# Patient Record
Sex: MALE | Race: WHITE
[De-identification: names, ages, dates, MRNs, and addresses within clinical notes are randomized per-mention and may not be internally consistent; named-entity substitution may affect disease eponyms.]

---

## 2019-09-26 ENCOUNTER — HOSPITAL ENCOUNTER (INPATIENT)
Dept: HOSPITAL 26 - MED | Age: 34
LOS: 4 days | Discharge: HOME | DRG: 720 | End: 2019-09-30
Attending: GENERAL PRACTICE | Admitting: GENERAL PRACTICE
Payer: MEDICAID

## 2019-09-26 VITALS — BODY MASS INDEX: 42.44 KG/M2 | WEIGHT: 280 LBS | HEIGHT: 68 IN

## 2019-09-26 VITALS — DIASTOLIC BLOOD PRESSURE: 90 MMHG | SYSTOLIC BLOOD PRESSURE: 147 MMHG

## 2019-09-26 VITALS — DIASTOLIC BLOOD PRESSURE: 75 MMHG | SYSTOLIC BLOOD PRESSURE: 135 MMHG

## 2019-09-26 DIAGNOSIS — M72.6: ICD-10-CM

## 2019-09-26 DIAGNOSIS — R65.20: ICD-10-CM

## 2019-09-26 DIAGNOSIS — E44.0: ICD-10-CM

## 2019-09-26 DIAGNOSIS — Z87.891: ICD-10-CM

## 2019-09-26 DIAGNOSIS — R73.03: ICD-10-CM

## 2019-09-26 DIAGNOSIS — Z91.013: ICD-10-CM

## 2019-09-26 DIAGNOSIS — E66.01: ICD-10-CM

## 2019-09-26 DIAGNOSIS — E87.8: ICD-10-CM

## 2019-09-26 DIAGNOSIS — E87.1: ICD-10-CM

## 2019-09-26 DIAGNOSIS — L03.312: ICD-10-CM

## 2019-09-26 DIAGNOSIS — L02.212: ICD-10-CM

## 2019-09-26 DIAGNOSIS — A41.9: Primary | ICD-10-CM

## 2019-09-26 LAB
ALBUMIN FLD-MCNC: 3 G/DL (ref 3.4–5)
AMYLASE SERPL-CCNC: 46 U/L (ref 25–115)
ANION GAP SERPL CALCULATED.3IONS-SCNC: 14.2 MMOL/L (ref 8–16)
AST SERPL-CCNC: 16 U/L (ref 15–37)
BILIRUB SERPL-MCNC: 0.6 MG/DL (ref 0–1)
BUN SERPL-MCNC: 13 MG/DL (ref 7–18)
CHLORIDE SERPL-SCNC: 97 MMOL/L (ref 98–107)
CO2 SERPL-SCNC: 25.9 MMOL/L (ref 21–32)
CREAT SERPL-MCNC: 1 MG/DL (ref 0.7–1.3)
ERYTHROCYTE [DISTWIDTH] IN BLOOD BY AUTOMATED COUNT: 13.1 % (ref 11.6–13.7)
GFR SERPL CREATININE-BSD FRML MDRD: 110 ML/MIN (ref 90–?)
GLUCOSE SERPL-MCNC: 269 MG/DL (ref 74–106)
HCT VFR BLD AUTO: 44.1 % (ref 36–52)
HGB BLD-MCNC: 14.7 G/DL (ref 12–18)
LIPASE SERPL-CCNC: 355 U/L (ref 73–393)
LYMPHOCYTES NFR BLD MANUAL: 11 % (ref 20–46)
MAGNESIUM SERPL-MCNC: 1.8 MG/DL (ref 1.8–2.4)
MCH RBC QN AUTO: 32 PG (ref 27–31)
MCHC RBC AUTO-ENTMCNC: 33 G/DL (ref 33–37)
MCV RBC AUTO: 94.9 FL (ref 80–94)
MONOCYTES NFR BLD MANUAL: 8 % (ref 5–12)
PHOSPHATE SERPL-MCNC: 3.5 MG/DL (ref 2.5–4.9)
PLATELET # BLD AUTO: 347 K/UL (ref 140–450)
POTASSIUM SERPL-SCNC: 4.1 MMOL/L (ref 3.5–5.1)
PROTHROMBIN TIME: 10.4 SECS (ref 10.8–13.4)
RBC # BLD AUTO: 4.65 MIL/UL (ref 4.2–6.1)
SODIUM SERPL-SCNC: 133 MMOL/L (ref 136–145)
T4 FREE SERPL-MCNC: 1.44 NG/DL (ref 0.76–1.46)
TSH SERPL DL<=0.05 MIU/L-ACNC: 2.78 UIU/ML (ref 0.34–3.74)
WBC # BLD AUTO: 23.2 K/UL (ref 4.8–10.8)

## 2019-09-26 PROCEDURE — 0JB70ZZ EXCISION OF BACK SUBCUTANEOUS TISSUE AND FASCIA, OPEN APPROACH: ICD-10-PCS | Performed by: SURGERY

## 2019-09-26 RX ADMIN — DOCUSATE SODIUM SCH MG: 100 CAPSULE, LIQUID FILLED ORAL at 21:38

## 2019-09-26 RX ADMIN — INSULIN LISPRO PRN UNITS: 100 INJECTION, SOLUTION INTRAVENOUS; SUBCUTANEOUS at 21:58

## 2019-09-26 RX ADMIN — SODIUM CHLORIDE SCH MLS/HR: 9 INJECTION, SOLUTION INTRAVENOUS at 21:38

## 2019-09-26 RX ADMIN — Medication SCH DEV: at 21:48

## 2019-09-27 VITALS — DIASTOLIC BLOOD PRESSURE: 70 MMHG | SYSTOLIC BLOOD PRESSURE: 113 MMHG

## 2019-09-27 VITALS — DIASTOLIC BLOOD PRESSURE: 74 MMHG | SYSTOLIC BLOOD PRESSURE: 110 MMHG

## 2019-09-27 VITALS — DIASTOLIC BLOOD PRESSURE: 73 MMHG | SYSTOLIC BLOOD PRESSURE: 126 MMHG

## 2019-09-27 LAB
ANION GAP SERPL CALCULATED.3IONS-SCNC: 16.3 MMOL/L (ref 8–16)
BASOPHILS # BLD AUTO: 0.1 K/UL (ref 0–0.22)
BASOPHILS NFR BLD AUTO: 0.3 % (ref 0–2)
BUN SERPL-MCNC: 9 MG/DL (ref 7–18)
CHLORIDE SERPL-SCNC: 100 MMOL/L (ref 98–107)
CHOLEST/HDLC SERPL: 8.3 {RATIO} (ref 1–4.5)
CO2 SERPL-SCNC: 23.8 MMOL/L (ref 21–32)
CREAT SERPL-MCNC: 0.9 MG/DL (ref 0.7–1.3)
EOSINOPHIL # BLD AUTO: 0.2 K/UL (ref 0–0.4)
EOSINOPHIL NFR BLD AUTO: 1.2 % (ref 0–4)
ERYTHROCYTE [DISTWIDTH] IN BLOOD BY AUTOMATED COUNT: 13.1 % (ref 11.6–13.7)
GFR SERPL CREATININE-BSD FRML MDRD: 124 ML/MIN (ref 90–?)
GLUCOSE SERPL-MCNC: 188 MG/DL (ref 74–106)
HCT VFR BLD AUTO: 43.2 % (ref 36–52)
HDLC SERPL-MCNC: 12 MG/DL (ref 40–60)
HGB BLD-MCNC: 14.4 G/DL (ref 12–18)
LDLC SERPL CALC-MCNC: 65 MG/DL (ref 60–100)
LYMPHOCYTES # BLD AUTO: 1.7 K/UL (ref 2–11.5)
LYMPHOCYTES NFR BLD AUTO: 11 % (ref 20.5–51.1)
MAGNESIUM SERPL-MCNC: 1.8 MG/DL (ref 1.8–2.4)
MCH RBC QN AUTO: 32 PG (ref 27–31)
MCHC RBC AUTO-ENTMCNC: 33 G/DL (ref 33–37)
MCV RBC AUTO: 95.2 FL (ref 80–94)
MONOCYTES # BLD AUTO: 1.1 K/UL (ref 0.8–1)
MONOCYTES NFR BLD AUTO: 7 % (ref 1.7–9.3)
NEUTROPHILS # BLD AUTO: 12.8 K/UL (ref 1.8–7.7)
NEUTROPHILS NFR BLD AUTO: 80.5 % (ref 42.2–75.2)
PHOSPHATE SERPL-MCNC: 3.9 MG/DL (ref 2.5–4.9)
PLATELET # BLD AUTO: 378 K/UL (ref 140–450)
POTASSIUM SERPL-SCNC: 4.1 MMOL/L (ref 3.5–5.1)
RBC # BLD AUTO: 4.54 MIL/UL (ref 4.2–6.1)
SODIUM SERPL-SCNC: 136 MMOL/L (ref 136–145)
TRIGL SERPL-MCNC: 114 MG/DL (ref 30–150)
WBC # BLD AUTO: 15.9 K/UL (ref 4.8–10.8)

## 2019-09-27 RX ADMIN — DEXTROSE SCH MLS/HR: 50 INJECTION, SOLUTION INTRAVENOUS at 05:53

## 2019-09-27 RX ADMIN — DEXTROSE SCH MLS/HR: 50 INJECTION, SOLUTION INTRAVENOUS at 00:33

## 2019-09-27 RX ADMIN — DEXTROSE SCH MLS/HR: 50 INJECTION, SOLUTION INTRAVENOUS at 23:25

## 2019-09-27 RX ADMIN — INSULIN LISPRO PRN UNITS: 100 INJECTION, SOLUTION INTRAVENOUS; SUBCUTANEOUS at 06:35

## 2019-09-27 RX ADMIN — Medication SCH DEV: at 06:31

## 2019-09-27 RX ADMIN — SODIUM CHLORIDE PRN MG: 9 INJECTION, SOLUTION INTRAVENOUS at 10:24

## 2019-09-27 RX ADMIN — DEXTROSE SCH MLS/HR: 50 INJECTION, SOLUTION INTRAVENOUS at 19:26

## 2019-09-27 RX ADMIN — SODIUM CHLORIDE SCH MLS/HR: 9 INJECTION, SOLUTION INTRAVENOUS at 21:29

## 2019-09-27 RX ADMIN — Medication SCH DEV: at 11:30

## 2019-09-27 RX ADMIN — DEXTROSE SCH MLS/HR: 5 SOLUTION INTRAVENOUS at 08:41

## 2019-09-27 RX ADMIN — SODIUM CHLORIDE SCH MLS/HR: 9 INJECTION, SOLUTION INTRAVENOUS at 08:42

## 2019-09-27 RX ADMIN — SODIUM CHLORIDE SCH MLS/HR: 9 INJECTION, SOLUTION INTRAVENOUS at 05:54

## 2019-09-27 RX ADMIN — Medication SCH DEV: at 16:44

## 2019-09-27 RX ADMIN — DEXTROSE SCH MLS/HR: 50 INJECTION, SOLUTION INTRAVENOUS at 13:06

## 2019-09-27 RX ADMIN — DEXTROSE SCH MLS/HR: 5 SOLUTION INTRAVENOUS at 16:36

## 2019-09-27 RX ADMIN — SODIUM CHLORIDE SCH MLS/HR: 9 INJECTION, SOLUTION INTRAVENOUS at 15:35

## 2019-09-27 RX ADMIN — INSULIN LISPRO PRN UNITS: 100 INJECTION, SOLUTION INTRAVENOUS; SUBCUTANEOUS at 21:27

## 2019-09-27 RX ADMIN — DOCUSATE SODIUM SCH MG: 100 CAPSULE, LIQUID FILLED ORAL at 08:41

## 2019-09-27 RX ADMIN — DOCUSATE SODIUM SCH MG: 100 CAPSULE, LIQUID FILLED ORAL at 21:14

## 2019-09-27 RX ADMIN — Medication SCH DEV: at 21:14

## 2019-09-27 RX ADMIN — HYDROCODONE BITARTRATE AND ACETAMINOPHEN PRN TAB: 7.5; 325 TABLET ORAL at 16:51

## 2019-09-27 RX ADMIN — Medication SCH EACH: at 08:41

## 2019-09-27 RX ADMIN — INSULIN LISPRO PRN UNITS: 100 INJECTION, SOLUTION INTRAVENOUS; SUBCUTANEOUS at 13:12

## 2019-09-28 VITALS — DIASTOLIC BLOOD PRESSURE: 93 MMHG | SYSTOLIC BLOOD PRESSURE: 135 MMHG

## 2019-09-28 VITALS — SYSTOLIC BLOOD PRESSURE: 140 MMHG | DIASTOLIC BLOOD PRESSURE: 86 MMHG

## 2019-09-28 VITALS — DIASTOLIC BLOOD PRESSURE: 83 MMHG | SYSTOLIC BLOOD PRESSURE: 134 MMHG

## 2019-09-28 LAB
ANION GAP SERPL CALCULATED.3IONS-SCNC: 13 MMOL/L (ref 8–16)
BASOPHILS # BLD AUTO: 0.1 K/UL (ref 0–0.22)
BASOPHILS NFR BLD AUTO: 1.2 % (ref 0–2)
BUN SERPL-MCNC: 11 MG/DL (ref 7–18)
CHLORIDE SERPL-SCNC: 100 MMOL/L (ref 98–107)
CO2 SERPL-SCNC: 27.4 MMOL/L (ref 21–32)
CREAT SERPL-MCNC: 0.8 MG/DL (ref 0.7–1.3)
EOSINOPHIL # BLD AUTO: 0.3 K/UL (ref 0–0.4)
EOSINOPHIL NFR BLD AUTO: 2.7 % (ref 0–4)
ERYTHROCYTE [DISTWIDTH] IN BLOOD BY AUTOMATED COUNT: 13.5 % (ref 11.6–13.7)
GFR SERPL CREATININE-BSD FRML MDRD: 142 ML/MIN (ref 90–?)
GLUCOSE SERPL-MCNC: 162 MG/DL (ref 74–106)
HCT VFR BLD AUTO: 42.6 % (ref 36–52)
HGB BLD-MCNC: 13.9 G/DL (ref 12–18)
LYMPHOCYTES # BLD AUTO: 2.3 K/UL (ref 2–11.5)
LYMPHOCYTES NFR BLD AUTO: 18.4 % (ref 20.5–51.1)
MAGNESIUM SERPL-MCNC: 1.9 MG/DL (ref 1.8–2.4)
MCH RBC QN AUTO: 31 PG (ref 27–31)
MCHC RBC AUTO-ENTMCNC: 33 G/DL (ref 33–37)
MCV RBC AUTO: 95.8 FL (ref 80–94)
MONOCYTES # BLD AUTO: 0.9 K/UL (ref 0.8–1)
MONOCYTES NFR BLD AUTO: 7.6 % (ref 1.7–9.3)
NEUTROPHILS # BLD AUTO: 8.7 K/UL (ref 1.8–7.7)
NEUTROPHILS NFR BLD AUTO: 70.1 % (ref 42.2–75.2)
PHOSPHATE SERPL-MCNC: 4.1 MG/DL (ref 2.5–4.9)
PLATELET # BLD AUTO: 350 K/UL (ref 140–450)
POTASSIUM SERPL-SCNC: 4.4 MMOL/L (ref 3.5–5.1)
RBC # BLD AUTO: 4.44 MIL/UL (ref 4.2–6.1)
SODIUM SERPL-SCNC: 136 MMOL/L (ref 136–145)
WBC # BLD AUTO: 12.4 K/UL (ref 4.8–10.8)

## 2019-09-28 RX ADMIN — INSULIN LISPRO PRN UNITS: 100 INJECTION, SOLUTION INTRAVENOUS; SUBCUTANEOUS at 06:29

## 2019-09-28 RX ADMIN — Medication SCH DEV: at 17:28

## 2019-09-28 RX ADMIN — SODIUM CHLORIDE SCH MLS/HR: 9 INJECTION, SOLUTION INTRAVENOUS at 20:48

## 2019-09-28 RX ADMIN — DEXTROSE SCH MLS/HR: 5 SOLUTION INTRAVENOUS at 18:30

## 2019-09-28 RX ADMIN — Medication SCH DEV: at 06:29

## 2019-09-28 RX ADMIN — Medication SCH EACH: at 08:44

## 2019-09-28 RX ADMIN — DEXTROSE SCH MLS/HR: 5 SOLUTION INTRAVENOUS at 01:20

## 2019-09-28 RX ADMIN — DEXTROSE SCH MLS/HR: 50 INJECTION, SOLUTION INTRAVENOUS at 06:32

## 2019-09-28 RX ADMIN — HYDROCODONE BITARTRATE AND ACETAMINOPHEN PRN TAB: 7.5; 325 TABLET ORAL at 08:44

## 2019-09-28 RX ADMIN — INSULIN LISPRO PRN UNITS: 100 INJECTION, SOLUTION INTRAVENOUS; SUBCUTANEOUS at 13:09

## 2019-09-28 RX ADMIN — DOCUSATE SODIUM SCH MG: 100 CAPSULE, LIQUID FILLED ORAL at 08:44

## 2019-09-28 RX ADMIN — Medication SCH DEV: at 13:07

## 2019-09-28 RX ADMIN — DEXTROSE SCH MLS/HR: 50 INJECTION, SOLUTION INTRAVENOUS at 13:10

## 2019-09-28 RX ADMIN — DOCUSATE SODIUM SCH MG: 100 CAPSULE, LIQUID FILLED ORAL at 08:55

## 2019-09-28 RX ADMIN — HYDROCODONE BITARTRATE AND ACETAMINOPHEN PRN TAB: 7.5; 325 TABLET ORAL at 18:26

## 2019-09-28 RX ADMIN — Medication SCH DEV: at 20:48

## 2019-09-28 RX ADMIN — DEXTROSE SCH MLS/HR: 5 SOLUTION INTRAVENOUS at 08:49

## 2019-09-28 RX ADMIN — DEXTROSE SCH MLS/HR: 50 INJECTION, SOLUTION INTRAVENOUS at 17:33

## 2019-09-28 RX ADMIN — DOCUSATE SODIUM SCH MG: 100 CAPSULE, LIQUID FILLED ORAL at 20:43

## 2019-09-28 RX ADMIN — INSULIN LISPRO PRN UNITS: 100 INJECTION, SOLUTION INTRAVENOUS; SUBCUTANEOUS at 20:47

## 2019-09-28 RX ADMIN — DEXTROSE SCH MLS/HR: 50 INJECTION, SOLUTION INTRAVENOUS at 23:18

## 2019-09-29 VITALS — DIASTOLIC BLOOD PRESSURE: 87 MMHG | SYSTOLIC BLOOD PRESSURE: 128 MMHG

## 2019-09-29 VITALS — SYSTOLIC BLOOD PRESSURE: 138 MMHG | DIASTOLIC BLOOD PRESSURE: 85 MMHG

## 2019-09-29 VITALS — DIASTOLIC BLOOD PRESSURE: 90 MMHG | SYSTOLIC BLOOD PRESSURE: 131 MMHG

## 2019-09-29 LAB
ANION GAP SERPL CALCULATED.3IONS-SCNC: 11.8 MMOL/L (ref 8–16)
BASOPHILS # BLD AUTO: 0 K/UL (ref 0–0.22)
BASOPHILS NFR BLD AUTO: 0.3 % (ref 0–2)
BUN SERPL-MCNC: 10 MG/DL (ref 7–18)
CHLORIDE SERPL-SCNC: 101 MMOL/L (ref 98–107)
CO2 SERPL-SCNC: 30.4 MMOL/L (ref 21–32)
CREAT SERPL-MCNC: 0.8 MG/DL (ref 0.7–1.3)
EOSINOPHIL # BLD AUTO: 0.3 K/UL (ref 0–0.4)
EOSINOPHIL NFR BLD AUTO: 2.9 % (ref 0–4)
ERYTHROCYTE [DISTWIDTH] IN BLOOD BY AUTOMATED COUNT: 13.1 % (ref 11.6–13.7)
GFR SERPL CREATININE-BSD FRML MDRD: 142 ML/MIN (ref 90–?)
GLUCOSE SERPL-MCNC: 148 MG/DL (ref 74–106)
HCT VFR BLD AUTO: 44 % (ref 36–52)
HGB BLD-MCNC: 14.5 G/DL (ref 12–18)
LYMPHOCYTES # BLD AUTO: 2.1 K/UL (ref 2–11.5)
LYMPHOCYTES NFR BLD AUTO: 21 % (ref 20.5–51.1)
MAGNESIUM SERPL-MCNC: 1.8 MG/DL (ref 1.8–2.4)
MCH RBC QN AUTO: 32 PG (ref 27–31)
MCHC RBC AUTO-ENTMCNC: 33 G/DL (ref 33–37)
MCV RBC AUTO: 95.9 FL (ref 80–94)
MONOCYTES # BLD AUTO: 0.7 K/UL (ref 0.8–1)
MONOCYTES NFR BLD AUTO: 7.4 % (ref 1.7–9.3)
NEUTROPHILS # BLD AUTO: 6.9 K/UL (ref 1.8–7.7)
NEUTROPHILS NFR BLD AUTO: 68.4 % (ref 42.2–75.2)
PHOSPHATE SERPL-MCNC: 4.3 MG/DL (ref 2.5–4.9)
PLATELET # BLD AUTO: 397 K/UL (ref 140–450)
POTASSIUM SERPL-SCNC: 4.2 MMOL/L (ref 3.5–5.1)
RBC # BLD AUTO: 4.59 MIL/UL (ref 4.2–6.1)
SODIUM SERPL-SCNC: 139 MMOL/L (ref 136–145)
WBC # BLD AUTO: 10 K/UL (ref 4.8–10.8)

## 2019-09-29 RX ADMIN — DEXTROSE SCH MLS/HR: 50 INJECTION, SOLUTION INTRAVENOUS at 17:25

## 2019-09-29 RX ADMIN — DEXTROSE SCH MLS/HR: 50 INJECTION, SOLUTION INTRAVENOUS at 12:54

## 2019-09-29 RX ADMIN — INSULIN LISPRO PRN UNITS: 100 INJECTION, SOLUTION INTRAVENOUS; SUBCUTANEOUS at 21:26

## 2019-09-29 RX ADMIN — DEXTROSE SCH MLS/HR: 5 SOLUTION INTRAVENOUS at 18:05

## 2019-09-29 RX ADMIN — Medication SCH DEV: at 21:06

## 2019-09-29 RX ADMIN — Medication SCH DEV: at 16:30

## 2019-09-29 RX ADMIN — DEXTROSE SCH MLS/HR: 50 INJECTION, SOLUTION INTRAVENOUS at 05:48

## 2019-09-29 RX ADMIN — DEXTROSE SCH MLS/HR: 5 SOLUTION INTRAVENOUS at 09:16

## 2019-09-29 RX ADMIN — Medication SCH DEV: at 06:37

## 2019-09-29 RX ADMIN — INSULIN LISPRO PRN UNITS: 100 INJECTION, SOLUTION INTRAVENOUS; SUBCUTANEOUS at 12:55

## 2019-09-29 RX ADMIN — Medication SCH DEV: at 11:30

## 2019-09-29 RX ADMIN — DOCUSATE SODIUM SCH MG: 100 CAPSULE, LIQUID FILLED ORAL at 21:00

## 2019-09-29 RX ADMIN — SODIUM CHLORIDE PRN MG: 9 INJECTION, SOLUTION INTRAVENOUS at 21:11

## 2019-09-29 RX ADMIN — SODIUM CHLORIDE SCH MLS/HR: 9 INJECTION, SOLUTION INTRAVENOUS at 13:40

## 2019-09-29 RX ADMIN — DOCUSATE SODIUM SCH MG: 100 CAPSULE, LIQUID FILLED ORAL at 21:07

## 2019-09-29 RX ADMIN — SODIUM CHLORIDE PRN MG: 9 INJECTION, SOLUTION INTRAVENOUS at 10:40

## 2019-09-29 RX ADMIN — DEXTROSE SCH MLS/HR: 5 SOLUTION INTRAVENOUS at 00:16

## 2019-09-29 RX ADMIN — Medication SCH EACH: at 09:11

## 2019-09-29 RX ADMIN — DOCUSATE SODIUM SCH MG: 100 CAPSULE, LIQUID FILLED ORAL at 09:00

## 2019-09-29 RX ADMIN — HYDROCODONE BITARTRATE AND ACETAMINOPHEN PRN TAB: 7.5; 325 TABLET ORAL at 09:10

## 2019-09-30 VITALS — SYSTOLIC BLOOD PRESSURE: 122 MMHG | DIASTOLIC BLOOD PRESSURE: 82 MMHG

## 2019-09-30 VITALS — DIASTOLIC BLOOD PRESSURE: 79 MMHG | SYSTOLIC BLOOD PRESSURE: 131 MMHG

## 2019-09-30 LAB
ANION GAP SERPL CALCULATED.3IONS-SCNC: 13.2 MMOL/L (ref 8–16)
BASOPHILS # BLD AUTO: 0.1 K/UL (ref 0–0.22)
BASOPHILS NFR BLD AUTO: 0.7 % (ref 0–2)
BUN SERPL-MCNC: 14 MG/DL (ref 7–18)
CHLORIDE SERPL-SCNC: 99 MMOL/L (ref 98–107)
CO2 SERPL-SCNC: 28.9 MMOL/L (ref 21–32)
CREAT SERPL-MCNC: 0.8 MG/DL (ref 0.7–1.3)
EOSINOPHIL # BLD AUTO: 0.2 K/UL (ref 0–0.4)
EOSINOPHIL NFR BLD AUTO: 2.1 % (ref 0–4)
ERYTHROCYTE [DISTWIDTH] IN BLOOD BY AUTOMATED COUNT: 13.2 % (ref 11.6–13.7)
GFR SERPL CREATININE-BSD FRML MDRD: 142 ML/MIN (ref 90–?)
GLUCOSE SERPL-MCNC: 137 MG/DL (ref 74–106)
HCT VFR BLD AUTO: 45.4 % (ref 36–52)
HGB BLD-MCNC: 15.1 G/DL (ref 12–18)
LYMPHOCYTES # BLD AUTO: 2.2 K/UL (ref 2–11.5)
LYMPHOCYTES NFR BLD AUTO: 19.5 % (ref 20.5–51.1)
MAGNESIUM SERPL-MCNC: 1.9 MG/DL (ref 1.8–2.4)
MCH RBC QN AUTO: 32 PG (ref 27–31)
MCHC RBC AUTO-ENTMCNC: 33 G/DL (ref 33–37)
MCV RBC AUTO: 95.1 FL (ref 80–94)
MONOCYTES # BLD AUTO: 0.7 K/UL (ref 0.8–1)
MONOCYTES NFR BLD AUTO: 6.1 % (ref 1.7–9.3)
NEUTROPHILS # BLD AUTO: 8.2 K/UL (ref 1.8–7.7)
NEUTROPHILS NFR BLD AUTO: 71.6 % (ref 42.2–75.2)
PHOSPHATE SERPL-MCNC: 4 MG/DL (ref 2.5–4.9)
PLATELET # BLD AUTO: 414 K/UL (ref 140–450)
POTASSIUM SERPL-SCNC: 4.1 MMOL/L (ref 3.5–5.1)
RBC # BLD AUTO: 4.77 MIL/UL (ref 4.2–6.1)
SODIUM SERPL-SCNC: 137 MMOL/L (ref 136–145)
WBC # BLD AUTO: 11.5 K/UL (ref 4.8–10.8)

## 2019-09-30 RX ADMIN — DEXTROSE SCH MLS/HR: 50 INJECTION, SOLUTION INTRAVENOUS at 00:14

## 2019-09-30 RX ADMIN — SODIUM CHLORIDE SCH MLS/HR: 9 INJECTION, SOLUTION INTRAVENOUS at 06:16

## 2019-09-30 RX ADMIN — DOCUSATE SODIUM SCH MG: 100 CAPSULE, LIQUID FILLED ORAL at 09:00

## 2019-09-30 RX ADMIN — HYDROCODONE BITARTRATE AND ACETAMINOPHEN PRN TAB: 7.5; 325 TABLET ORAL at 09:53

## 2019-09-30 RX ADMIN — Medication SCH DEV: at 06:21

## 2019-09-30 RX ADMIN — DEXTROSE SCH MLS/HR: 5 SOLUTION INTRAVENOUS at 01:14

## 2019-09-30 RX ADMIN — DEXTROSE SCH MLS/HR: 50 INJECTION, SOLUTION INTRAVENOUS at 05:26

## 2019-09-30 RX ADMIN — Medication SCH EACH: at 09:53

## 2019-12-25 ENCOUNTER — HOSPITAL ENCOUNTER (EMERGENCY)
Dept: HOSPITAL 26 - MED | Age: 34
Discharge: HOME | End: 2019-12-25
Payer: MEDICAID

## 2019-12-25 VITALS — SYSTOLIC BLOOD PRESSURE: 149 MMHG | DIASTOLIC BLOOD PRESSURE: 91 MMHG

## 2019-12-25 VITALS — HEIGHT: 69 IN | WEIGHT: 280 LBS | BODY MASS INDEX: 41.47 KG/M2

## 2019-12-25 VITALS — DIASTOLIC BLOOD PRESSURE: 87 MMHG | SYSTOLIC BLOOD PRESSURE: 133 MMHG

## 2019-12-25 DIAGNOSIS — L03.312: Primary | ICD-10-CM

## 2019-12-25 DIAGNOSIS — E11.9: ICD-10-CM

## 2019-12-25 DIAGNOSIS — Z91.013: ICD-10-CM

## 2019-12-25 DIAGNOSIS — Z79.899: ICD-10-CM

## 2019-12-25 PROCEDURE — 99283 EMERGENCY DEPT VISIT LOW MDM: CPT

## 2019-12-25 PROCEDURE — 96372 THER/PROPH/DIAG INJ SC/IM: CPT

## 2019-12-25 NOTE — NUR
Patient discharged with v/s stable. Written and verbal after care instructions 
given and explained REGARDING ABSCESS. 

Patient alert, oriented and verbalized understanding of instructions. 
Ambulatory with steady gait. All questions addressed prior to discharge. ID 
band removed. Patient advised to follow up with PMD. Rx of TRAMADOL, BACTRIM, 
AND CLINDAMYCIN given. Patient educated on indication of medication including 
possible reaction and side effects. Opportunity to ask questions provided and 
answered.

PT GIVEN EXCUSE FOR WORK THROUGH 12/30/19

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

## 2019-12-25 NOTE — NUR
C/O ABSCESS TO MID BACK- REDNESS WITH MILD SWELLING X 2 DAYS NOTED. ABSCESS 
APPROX 2 CM IN DIAMETER. NO DRAINAGE A THIS TIME. PER PATIENT, HE WAS SEEN IN 
[place] FOR ABSCESS IN SIMILAR AREA. PATIENT TOOK [place] LAST NIGHT WITH 
RELIEF, BUT STATES HE RAN OUT YESTERDAY. PAIN 8/10 AT THIS TIME. PT ALERT AND 
AWAKE, VS STABLE, AMBUALTORY WITH STEADY GAIT.



HX--DM

RX--NON COMPLIANT

## 2023-02-08 ENCOUNTER — HOSPITAL ENCOUNTER (EMERGENCY)
Dept: HOSPITAL 26 - MED | Age: 38
Discharge: HOME | End: 2023-02-08
Payer: MEDICAID

## 2023-02-08 VITALS — BODY MASS INDEX: 38.51 KG/M2 | HEIGHT: 69 IN | WEIGHT: 260 LBS

## 2023-02-08 VITALS — SYSTOLIC BLOOD PRESSURE: 165 MMHG | DIASTOLIC BLOOD PRESSURE: 108 MMHG

## 2023-02-08 VITALS — SYSTOLIC BLOOD PRESSURE: 124 MMHG | DIASTOLIC BLOOD PRESSURE: 76 MMHG
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
## 2023-02-08 DIAGNOSIS — E11.9: ICD-10-CM

## 2023-02-08 DIAGNOSIS — Z91.013: ICD-10-CM

## 2023-02-08 DIAGNOSIS — T78.40XA: Primary | ICD-10-CM

## 2023-02-08 DIAGNOSIS — L29.9: ICD-10-CM

## 2023-02-08 DIAGNOSIS — I10: ICD-10-CM

## 2023-02-08 DIAGNOSIS — Z79.899: ICD-10-CM

## 2023-02-08 PROCEDURE — 99284 EMERGENCY DEPT VISIT MOD MDM: CPT

## 2023-02-08 NOTE — NUR
BIBA for allergic reaction, pt had facial swelling after shrimp allergies. 
denies any SOB or difficulty breathing at this time. denies pain. noted rash on 
both arms and abdominal area. pmhx diabetes and hypertension. allergy to 
shrimp.

## 2023-02-08 NOTE — NUR
Patient discharged with v/s stable. Written and verbal after care instructions 
given and explained. New prescription for epipen.

Patient verbalized understanding. Ambulatory with steady gait. All questions 
addressed prior to discharge. Advised to follow up with PMD.

## 2023-02-08 NOTE — NUR
pt is here beause he thinks he has allergic reaction to shrimp. He has some 
rashes on the left hand and had some tingling sesnation.

## 2024-08-23 ENCOUNTER — HOSPITAL ENCOUNTER (EMERGENCY)
Dept: HOSPITAL 26 - MED | Age: 39
Discharge: HOME | End: 2024-08-23
Payer: COMMERCIAL

## 2024-08-23 VITALS — HEIGHT: 69 IN | WEIGHT: 287 LBS | BODY MASS INDEX: 42.51 KG/M2

## 2024-08-23 VITALS
OXYGEN SATURATION: 99 % | DIASTOLIC BLOOD PRESSURE: 95 MMHG | TEMPERATURE: 98.01 F | HEART RATE: 82 BPM | RESPIRATION RATE: 16 BRPM | SYSTOLIC BLOOD PRESSURE: 141 MMHG

## 2024-08-23 VITALS
OXYGEN SATURATION: 99 % | DIASTOLIC BLOOD PRESSURE: 111 MMHG | HEART RATE: 118 BPM | RESPIRATION RATE: 20 BRPM | SYSTOLIC BLOOD PRESSURE: 174 MMHG | TEMPERATURE: 98 F

## 2024-08-23 DIAGNOSIS — E11.9: ICD-10-CM

## 2024-08-23 DIAGNOSIS — Z91.013: ICD-10-CM

## 2024-08-23 DIAGNOSIS — I10: Primary | ICD-10-CM

## 2024-08-23 DIAGNOSIS — Z91.148: ICD-10-CM

## 2024-08-23 DIAGNOSIS — Z79.899: ICD-10-CM

## 2024-08-23 LAB
ANION GAP SERPL CALCULATED.3IONS-SCNC: 17.2 MMOL/L (ref 8–16)
BASOPHILS # BLD AUTO: 0.1 K/UL (ref 0–0.22)
BASOPHILS NFR BLD AUTO: 0.7 % (ref 0–2)
BUN SERPL-MCNC: 8 MG/DL (ref 7–18)
CALCIUM SPEC-MCNC: 9.5 MG/DL (ref 8.5–10.1)
CHLORIDE SERPL-SCNC: 96 MMOL/L (ref 98–107)
CO2 SERPL-SCNC: 25.3 MMOL/L (ref 21–32)
CREAT SERPL-MCNC: 0.8 MG/DL (ref 0.6–1.3)
EOSINOPHIL # BLD AUTO: 0.1 K/UL (ref 0–0.4)
EOSINOPHIL NFR BLD AUTO: 0.5 % (ref 0–4)
ERYTHROCYTE [DISTWIDTH] IN BLOOD BY AUTOMATED COUNT: 12.8 % (ref 11.6–13.7)
GFR SERPL CREATININE-BSD FRML MDRD: 138 ML/MIN (ref 90–?)
GLUCOSE SERPL-MCNC: 170 MG/DL (ref 74–106)
HCT VFR BLD AUTO: 48.2 % (ref 36–52)
HGB BLD-MCNC: 16.7 G/DL (ref 12–18)
LYMPHOCYTES # BLD AUTO: 2.1 K/UL (ref 2–11.5)
LYMPHOCYTES NFR BLD AUTO: 19 % (ref 20.5–51.1)
MCH RBC QN AUTO: 33 PG (ref 27–31)
MCHC RBC AUTO-ENTMCNC: 35 G/DL (ref 33–37)
MCV RBC AUTO: 94.3 FL (ref 80–94)
MONOCYTES # BLD AUTO: 0.9 K/UL (ref 0.8–1)
MONOCYTES NFR BLD AUTO: 8.3 % (ref 1.7–9.3)
NEUTROPHILS # BLD AUTO: 8 K/UL (ref 1.8–7.7)
NEUTROPHILS NFR BLD AUTO: 71.5 % (ref 42.2–75.2)
PLATELET # BLD AUTO: 300 K/UL (ref 140–450)
POTASSIUM SERPL-SCNC: 3.5 MMOL/L (ref 3.5–5.1)
RBC # BLD AUTO: 5.11 MIL/UL (ref 4.2–6.1)
SODIUM SERPL-SCNC: 135 MMOL/L (ref 136–145)
WBC # BLD AUTO: 11.2 K/UL (ref 4.8–10.8)

## 2024-08-23 PROCEDURE — 84484 ASSAY OF TROPONIN QUANT: CPT

## 2024-08-23 PROCEDURE — 96360 HYDRATION IV INFUSION INIT: CPT

## 2024-08-23 PROCEDURE — 99285 EMERGENCY DEPT VISIT HI MDM: CPT

## 2024-08-23 PROCEDURE — 93005 ELECTROCARDIOGRAM TRACING: CPT

## 2024-08-23 PROCEDURE — 85025 COMPLETE CBC W/AUTO DIFF WBC: CPT

## 2024-08-23 PROCEDURE — 71045 X-RAY EXAM CHEST 1 VIEW: CPT

## 2024-08-23 PROCEDURE — 80048 BASIC METABOLIC PNL TOTAL CA: CPT

## 2024-08-23 PROCEDURE — 83880 ASSAY OF NATRIURETIC PEPTIDE: CPT

## 2024-08-23 PROCEDURE — 36415 COLL VENOUS BLD VENIPUNCTURE: CPT

## 2024-08-23 PROCEDURE — 70450 CT HEAD/BRAIN W/O DYE: CPT

## 2024-08-23 RX ADMIN — SODIUM CHLORIDE ONE MLS/HR: 9 INJECTION, SOLUTION INTRAVENOUS at 17:36
